# Patient Record
Sex: MALE | Race: BLACK OR AFRICAN AMERICAN | Employment: OTHER | URBAN - METROPOLITAN AREA
[De-identification: names, ages, dates, MRNs, and addresses within clinical notes are randomized per-mention and may not be internally consistent; named-entity substitution may affect disease eponyms.]

---

## 2022-10-15 ENCOUNTER — HOSPITAL ENCOUNTER (EMERGENCY)
Age: 37
Discharge: HOME | End: 2022-10-15
Attending: EMERGENCY MEDICINE | Admitting: EMERGENCY MEDICINE
Payer: OTHER GOVERNMENT

## 2022-10-15 VITALS
BODY MASS INDEX: 27.9 KG/M2 | DIASTOLIC BLOOD PRESSURE: 70 MMHG | OXYGEN SATURATION: 100 % | HEIGHT: 72 IN | WEIGHT: 206 LBS | RESPIRATION RATE: 18 BRPM | SYSTOLIC BLOOD PRESSURE: 127 MMHG | TEMPERATURE: 98 F | HEART RATE: 52 BPM

## 2022-10-15 DIAGNOSIS — T78.40XA ALLERGIC REACTION, INITIAL ENCOUNTER: Primary | ICD-10-CM

## 2022-10-15 PROCEDURE — 99282 EMERGENCY DEPT VISIT SF MDM: CPT

## 2022-10-15 NOTE — ED TRIAGE NOTES
Pt arrives to ER w hives, denies exposure to allergens, started this morning, pt took benadryl PO. Came to ER when sx were not improving, however he states now it seems hives are going away.

## 2022-10-15 NOTE — ED PROVIDER NOTES
EMERGENCY DEPARTMENT HISTORY AND PHYSICAL EXAM    Date: 10/15/2022  Patient Name: Thom Aguilar    History of Presenting Illness     Chief Complaint   Patient presents with    Rash         History Provided By: Patient    1300 PM  Thom Aguilar is a 30 y.o. male who presents to the emergency department C/O pruritic hives to neck, upper chest and bilateral arms which began about 2 hours prior to arrival.  He took Benadryl 1 hour ago and hives now have nearly resolved. He denies any known allergens, but states he did have his head and face shaved at a [facility] about 1 hour prior to onset of symptoms. Pt denies lip or tongue swelling, shortness of breath, and any other sxs or complaints. PCP: Other, MD Jospeh        Past History     Past Medical History:  History reviewed. No pertinent past medical history. Past Surgical History:  History reviewed. No pertinent surgical history. Family History:  History reviewed. No pertinent family history. Social History:  Social History     Tobacco Use    Smoking status: Never    Smokeless tobacco: Never   Substance Use Topics    Alcohol use: Not Currently    Drug use: Never       Allergies: Allergies   Allergen Reactions    Shellfish Derived Hives         Review of Systems   Review of Systems   Constitutional: Negative. HENT:  Negative for facial swelling. Respiratory:  Negative for shortness of breath. Skin:  Positive for rash. All other systems reviewed and are negative. Physical Exam     Vitals:    10/15/22 1214   BP: 127/70   Pulse: (!) 52   Resp: 18   Temp: 98 °F (36.7 °C)   SpO2: 100%   Weight: 93.4 kg (206 lb)   Height: 5' 11.5\" (1.816 m)     Physical Exam  Vital signs and nursing notes reviewed. CONSTITUTIONAL: Alert. Well-appearing; well-nourished; in no apparent distress. HEAD: Normocephalic; atraumatic. EYES: Conjunctiva clear. ENT: Normal pharynx. No facial lip or tongue swelling. Moist mucus membranes.        CV: Normal S1, S2; no murmurs, rubs, or gallops. No chest wall tenderness. RESPIRATORY: Normal chest excursion with respiration; breath sounds clear and equal bilaterally; no wheezes, rhonchi, or rales. SKIN: Normal for age and race; warm; dry; good turgor; just a few small hives to bilateral medial upper arms that are slightly pruritic. He states rest of hives have resolved. Skin is otherwise normal.  Head and beard line are freshly shaved without signs of infection erythema or rash. NEURO: A & O x3. PSYCH:  Mood and affect appropriate. Diagnostic Study Results     Labs -   No results found for this or any previous visit (from the past 12 hour(s)). Radiologic Studies -   No orders to display     CT Results  (Last 48 hours)      None          CXR Results  (Last 48 hours)      None            Medications given in the ED-  Medications - No data to display      Medical Decision Making   I am the first provider for this patient. I reviewed the vital signs, available nursing notes, past medical history, past surgical history, family history and social history. Vital Signs-Reviewed the patient's vital signs. Records Reviewed: Nursing Notes      Procedures:  Procedures    ED Course:  1300 PM   Initial assessment performed. The patients presenting problems have been discussed, and they are in agreement with the care plan formulated and outlined with them. I have encouraged them to ask questions as they arise throughout their visit. Provider Notes (Medical Decision Making): Beatriz Galeana is a 30 y.o. male [place] with resolving allergic reaction, likely to products used at the [facility] prior to onset of symptoms. He has already showered and took Benadryl appropriately and hives nearly resolved. No further intervention indicated at this time. Recommend take Benadryl as needed and avoid products used return ED if any shortness of breath lip or tongue swelling, which is not expected.     Diagnosis and Disposition       DISCHARGE NOTE:    Nora Summers  results have been reviewed with him. He has been counseled regarding his diagnosis, treatment, and plan. He verbally conveys understanding and agreement of the signs, symptoms, diagnosis, treatment and prognosis and additionally agrees to follow up as discussed. He also agrees with the care-plan and conveys that all of his questions have been answered. I have also provided discharge instructions for him that include: educational information regarding their diagnosis and treatment, and list of reasons why they would want to return to the ED prior to their follow-up appointment, should his condition change. He has been provided with education for proper emergency department utilization. CLINICAL IMPRESSION:    1. Allergic reaction, initial encounter        PLAN:  1. D/C Home  2. There are no discharge medications for this patient. 3.   Follow-up Information       Follow up With Specialties Details Why Contact Info    Your PCP   As needed     [facility] EMERGENCY DEPT Emergency Medicine  As needed, If symptoms worsen [address] [id number]  [phone]          _______________________________      Please note that this dictation was completed with [employer], the computer voice recognition software. Quite often unanticipated grammatical, syntax, homophones, and other interpretive errors are inadvertently transcribed by the computer software. Please disregard these errors. Please excuse any errors that have escaped final proofreading.